# Patient Record
Sex: MALE | Race: BLACK OR AFRICAN AMERICAN | NOT HISPANIC OR LATINO | URBAN - METROPOLITAN AREA
[De-identification: names, ages, dates, MRNs, and addresses within clinical notes are randomized per-mention and may not be internally consistent; named-entity substitution may affect disease eponyms.]

---

## 2023-03-11 PROBLEM — I50.32 CHRONIC HEART FAILURE WITH PRESERVED EJECTION FRACTION (MULTI): Status: ACTIVE | Noted: 2023-03-11

## 2023-03-11 PROBLEM — Z99.2 ESRD ON HEMODIALYSIS (MULTI): Status: ACTIVE | Noted: 2023-03-11

## 2023-03-11 PROBLEM — I25.10 CAD (CORONARY ARTERY DISEASE): Status: ACTIVE | Noted: 2023-03-11

## 2023-03-11 PROBLEM — I10 HTN (HYPERTENSION): Status: ACTIVE | Noted: 2023-03-11

## 2023-03-11 PROBLEM — M10.9 GOUT: Status: ACTIVE | Noted: 2023-03-11

## 2023-03-11 PROBLEM — N18.6 ESRD ON HEMODIALYSIS (MULTI): Status: ACTIVE | Noted: 2023-03-11

## 2023-03-11 PROBLEM — F01.50 VASCULAR DEMENTIA WITHOUT BEHAVIORAL DISTURBANCE (MULTI): Status: ACTIVE | Noted: 2023-03-11

## 2023-03-30 ENCOUNTER — NURSING HOME VISIT (OUTPATIENT)
Dept: POST ACUTE CARE | Facility: EXTERNAL LOCATION | Age: 78
End: 2023-03-30
Payer: OTHER GOVERNMENT

## 2023-03-30 ENCOUNTER — NURSING HOME VISIT (OUTPATIENT)
Dept: POST ACUTE CARE | Facility: EXTERNAL LOCATION | Age: 78
End: 2023-03-30

## 2023-03-30 VITALS
HEART RATE: 75 BPM | SYSTOLIC BLOOD PRESSURE: 157 MMHG | OXYGEN SATURATION: 99 % | DIASTOLIC BLOOD PRESSURE: 80 MMHG | RESPIRATION RATE: 18 BRPM | WEIGHT: 143 LBS | TEMPERATURE: 98 F

## 2023-03-30 VITALS
RESPIRATION RATE: 18 BRPM | HEART RATE: 75 BPM | WEIGHT: 144 LBS | SYSTOLIC BLOOD PRESSURE: 157 MMHG | OXYGEN SATURATION: 99 % | DIASTOLIC BLOOD PRESSURE: 80 MMHG | TEMPERATURE: 98 F

## 2023-03-30 DIAGNOSIS — I10 PRIMARY HYPERTENSION: ICD-10-CM

## 2023-03-30 DIAGNOSIS — F01.50 VASCULAR DEMENTIA WITHOUT BEHAVIORAL DISTURBANCE (MULTI): ICD-10-CM

## 2023-03-30 DIAGNOSIS — Z99.2 ESRD ON HEMODIALYSIS (MULTI): ICD-10-CM

## 2023-03-30 DIAGNOSIS — N18.6 ESRD ON HEMODIALYSIS (MULTI): ICD-10-CM

## 2023-03-30 DIAGNOSIS — K92.2 GASTROINTESTINAL HEMORRHAGE, UNSPECIFIED GASTROINTESTINAL HEMORRHAGE TYPE: ICD-10-CM

## 2023-03-30 DIAGNOSIS — D63.8 ANEMIA IN OTHER CHRONIC DISEASES CLASSIFIED ELSEWHERE: ICD-10-CM

## 2023-03-30 DIAGNOSIS — I82.623 ACUTE DEEP VEIN THROMBOSIS (DVT) OF BOTH UPPER EXTREMITIES, UNSPECIFIED VEIN (MULTI): Primary | ICD-10-CM

## 2023-03-30 DIAGNOSIS — I50.32 CHRONIC HEART FAILURE WITH PRESERVED EJECTION FRACTION (MULTI): ICD-10-CM

## 2023-03-30 PROBLEM — D64.89 OTHER SPECIFIED ANEMIAS: Status: ACTIVE | Noted: 2023-03-30

## 2023-03-30 PROCEDURE — 99310 SBSQ NF CARE HIGH MDM 45: CPT | Performed by: NURSE PRACTITIONER

## 2023-03-30 PROCEDURE — 99305 1ST NF CARE MODERATE MDM 35: CPT | Performed by: INTERNAL MEDICINE

## 2023-03-30 ASSESSMENT — ENCOUNTER SYMPTOMS
FEVER: 0
DIARRHEA: 0
COUGH: 0
NAUSEA: 0
VOMITING: 0
FATIGUE: 0
PALPITATIONS: 0
CONSTIPATION: 0
SHORTNESS OF BREATH: 0
CHILLS: 0
ABDOMINAL PAIN: 0

## 2023-03-30 NOTE — PROGRESS NOTES
Subjective   Patient ID: Raymond Bruton is a 77 y.o. male who is long term resident being seen and evaluated for multiple medical problems.    HPI   Raymond Bruton is a 77 y.o. male returned from hospitalization due to  bilateral upper extremity edema, found to have bilateral DVT's to upper extremities.    HPI   Minimal records available to review.  He staets he was told a clot in each arm.  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  He has a hx of reent gi bleedig, unidentified source of bleeding after sigmoidoscopy and capsule endoscopy.  He is a Hinduism and refuses blood transfusions.  He was evaluated by hematology and started on low dose eliquis, initial plan is to continue for 6 weeks, he is to follow up with hematology in 5 weeks.  He states he is feeling well, had a bowel movement earlier today.    Per notes he was hypertensive during hospital stay and coreg dose was increased.    Bowel products also increased in an attempt to decrease any straining with bowel movements.   Per GI notes possible source of recent gi bleed was hemorrhoid vs diverticular vs anastamosis site.    Labs will be monitored through dialysis center    As above the patient presents having had bilateral arm DVTs diagnosed.  He is cautiously being anticoagulated with Eliquis for 6 weeks.  This of course will have to be reconsidered if he does manifest any recurrent GI bleeding.  He is resting comfortably in his room and has no complaints of pain or shortness of breath me at this time.  He continues with regular treatments at the dialysis center who monitor his lab work.  The patient is a Baptism apparently and is reluctant to receive blood transfusion in the event of significant bleeding.        No labs available for review     MEDS:  Amlodipine  Apixaban (new)  Ascorbic acid  Coreg (dose increased)  Vitamin D  B12  Senna plus (dose increased)  Doxercalciferol through  dialysis center  Hydralaizine  Lisinopril  Renal MVI  Pantoprazole  Miralax  Sevelamer  Ferrous sulfate  Folic acid  MVI  Review of Systems   Constitutional:  Negative for chills, diaphoresis and fever.   Respiratory:  Negative for cough and shortness of breath.    Cardiovascular:  Negative for chest pain and leg swelling.   Gastrointestinal:  Negative for constipation, diarrhea, nausea and vomiting.   Musculoskeletal:  Negative for joint swelling and myalgias.       Objective   /80   Pulse 75   Temp 36.7 °C (98 °F)   Resp 18   Wt 65.3 kg (144 lb)   SpO2 99%     Physical Exam  Vitals reviewed.   Constitutional:       General: He is not in acute distress.     Appearance: He is not ill-appearing.   Cardiovascular:      Rate and Rhythm: Normal rate and regular rhythm.      Pulses: Normal pulses.      Heart sounds:      No gallop.   Pulmonary:      Breath sounds: Normal breath sounds. No wheezing, rhonchi or rales.   Abdominal:      General: Abdomen is flat. Bowel sounds are normal.      Palpations: Abdomen is soft.      Tenderness: There is no guarding or rebound.   Musculoskeletal:      Right lower leg: No edema.      Left lower leg: No edema.      Comments: The patient has an indwelling hemodialysis catheter in the right upper chest.         Assessment/Plan   Problem List Items Addressed This Visit          Circulatory    Chronic heart failure with preserved ejection fraction (CMS/HCC)    HTN (hypertension)    Acute deep vein thrombosis (DVT) of both upper extremities (CMS/HCC) - Primary       Digestive    GI bleed       Genitourinary    ESRD on hemodialysis (CMS/HCC)       Hematologic    Other specified anemias        Goals    None     A.  We will continue with restorative and supportive care as the patient tolerates    B.  The patient left and ongoing follow-up with hemodialysis for renal replacement therapy for his end-stage renal disease    C.  As mentioned above the patient is cautiously  anticoagulated with Eliquis renal dose and this will be continued for 6 weeks as requested and recommended.    D.  Should the patient have significant GI bleeding then anticoagulation will have to be discontinued.    8.  This patient's prognosis is guarded.

## 2023-03-30 NOTE — PROGRESS NOTES
Subjective   Raymond Bruton is a 77 y.o. male returned from hospitalization due to  bilateral upper extremity edema, found to have bilateral DVT's to upper extremities.    [facility] in [place]   Minimal records available to review.  He staets he was told a clot in each arm.  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  He has a hx of reent gi bleedig, unidentified source of bleeding after sigmoidoscopy and capsule endoscopy.  He is a Catholic and refuses blood transfusions.  He was evaluated by hematology and started on low dose eliquis, initial plan is to continue for 6 weeks, he is to follow up with hematology in 5 weeks.  He states he is feeling well, had a bowel movement earlier today.    Per notes he was hypertensive during hospital stay and coreg dose was increased.    Bowel products also increased in an attempt to decrease any straining with bowel movements.   Per GI notes possible source of recent gi bleed was hemorrhoid vs diverticular vs anastamosis site.    Labs will be monitored through dialysis center       No labs available for review    MEDS:  Amlodipine  Apixaban (new)  Ascorbic acid  Coreg (dose increased)  Vitamin D  B12  Senna plus (dose increased)  Doxercalciferol through dialysis center  Hydralaizine  Lisinopril  Renal MVI  Pantoprazole  Miralax  Sevelamer  Ferrous sulfate  Folic acid  MVI      Review of Systems   Constitutional:  Negative for chills, fatigue and fever.   Respiratory:  Negative for cough and shortness of breath.    Cardiovascular:  Negative for chest pain and palpitations.   Gastrointestinal:  Negative for abdominal pain, constipation, diarrhea, nausea and vomiting.       Objective   /80   Pulse 75   Temp 36.7 °C (98 °F)   Resp 18   Wt 64.9 kg (143 lb)   SpO2 99%     Physical Exam  Constitutional:       General: He is not in acute distress.  HENT:      Head: Normocephalic and atraumatic.      Mouth/Throat:      Comments: Poor  dentition  Eyes:      Conjunctiva/sclera: Conjunctivae normal.   Cardiovascular:      Rate and Rhythm: Normal rate and regular rhythm.   Pulmonary:      Effort: Pulmonary effort is normal. No respiratory distress.      Breath sounds: Normal breath sounds.      Comments: Slightly diminished  Abdominal:      General: Bowel sounds are normal. There is no distension.      Palpations: Abdomen is soft.      Tenderness: There is no abdominal tenderness.   Musculoskeletal:         General: Normal range of motion.      Right lower leg: No edema.      Left lower leg: No edema.      Comments: Mild edema noted to RUE   Skin:     General: Skin is warm and dry.      Comments: Right upper chest wall hemodialysis catheters intact    Neurological:      General: No focal deficit present.      Mental Status: He is alert.      Comments: Slightly poor historian   Psychiatric:         Mood and Affect: Mood normal.         Behavior: Behavior normal.         Assessment/Plan   Problem List Items Addressed This Visit          Nervous    Vascular dementia without behavioral disturbance (CMS/HCC)     Oriented times 2,  he was deemed unable to make medical or financial decisions.  Daughter Aimee is POA.              Circulatory    Chronic heart failure with preserved ejection fraction (CMS/HCC)     Fluid status monitored by dialysis center.   No symptoms of central fluid volume overload.             HTN (hypertension)     He was hypertensive in hospital, coreg increased to daily.  Continue to monitor and adjust meds based on clinical course.           Acute deep vein thrombosis (DVT) of both upper extremities (CMS/HCC) - Primary     He was evaluated by hematology.  He has hx of recent GI bleed but also with dvt's he was started on low dose eliquis  Plan at this time is for elqiuis for 6 weeks, he is to follow up with hematology at 5 weeks for further risk vs benefit of continued anticoagulation.  He is a Orthodox and refuses blood  transfusions.     His bowel products were increased to avoid straining while bowel movement.  He was instructed to not flush any bowel movement that appears to contain any sx of blood so that nurse can evaluate.              Digestive    GI bleed     Staff to monitor and notify for any recurrence of symptoms.              Genitourinary    ESRD on hemodialysis (CMS/[facility])     Follow up with dialysis center.    Labs to be monitored through dialysis center        labs/meds/orders reviewed  staff to monitor and notify for any changes.  Staff to closely monitor for any sx of bleeding  Follow up with dialysis center, labs to be monitored through dialysis center.   Follow up with hematology

## 2023-03-30 NOTE — ASSESSMENT & PLAN NOTE
He was hypertensive in hospital, coreg increased to daily.  Continue to monitor and adjust meds based on clinical course.

## 2023-03-30 NOTE — LETTER
Patient: Raymond Bruton  : 1945    Encounter Date: 2023    Subjective  Raymond Bruton is a 77 y.o. male returned from hospitalization due to  bilateral upper extremity edema, found to have bilateral DVT's to upper extremities.    [facility]   Minimal records available to review.  He staets he was told a clot in each arm.  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  He has a hx of reent gi bleedig, unidentified source of bleeding after sigmoidoscopy and capsule endoscopy.  He is a Anglican and refuses blood transfusions.  He was evaluated by hematology and started on low dose eliquis, initial plan is to continue for 6 weeks, he is to follow up with hematology in 5 weeks.  He states he is feeling well, had a bowel movement earlier today.    Per notes he was hypertensive during hospital stay and coreg dose was increased.    Bowel products also increased in an attempt to decrease any straining with bowel movements.   Per GI notes possible source of recent gi bleed was hemorrhoid vs diverticular vs anastamosis site.    Labs will be monitored through dialysis center       No labs available for review    MEDS:  Amlodipine  Apixaban (new)  Ascorbic acid  Coreg (dose increased)  Vitamin D  B12  Senna plus (dose increased)  Doxercalciferol through dialysis center  Hydralaizine  Lisinopril  Renal MVI  Pantoprazole  Miralax  Sevelamer  Ferrous sulfate  Folic acid  MVI      Review of Systems   Constitutional:  Negative for chills, fatigue and fever.   Respiratory:  Negative for cough and shortness of breath.    Cardiovascular:  Negative for chest pain and palpitations.   Gastrointestinal:  Negative for abdominal pain, constipation, diarrhea, nausea and vomiting.       Objective  /80   Pulse 75   Temp 36.7 °C (98 °F)   Resp 18   Wt 64.9 kg (143 lb)   SpO2 99%     Physical Exam  Constitutional:       General: He is not in acute distress.  HENT:       Head: Normocephalic and atraumatic.      Mouth/Throat:      Comments: Poor dentition  Eyes:      Conjunctiva/sclera: Conjunctivae normal.   Cardiovascular:      Rate and Rhythm: Normal rate and regular rhythm.   Pulmonary:      Effort: Pulmonary effort is normal. No respiratory distress.      Breath sounds: Normal breath sounds.      Comments: Slightly diminished  Abdominal:      General: Bowel sounds are normal. There is no distension.      Palpations: Abdomen is soft.      Tenderness: There is no abdominal tenderness.   Musculoskeletal:         General: Normal range of motion.      Right lower leg: No edema.      Left lower leg: No edema.      Comments: Mild edema noted to RUE   Skin:     General: Skin is warm and dry.      Comments: Right upper chest wall hemodialysis catheters intact    Neurological:      General: No focal deficit present.      Mental Status: He is alert.      Comments: Slightly poor historian   Psychiatric:         Mood and Affect: Mood normal.         Behavior: Behavior normal.         Assessment/Plan  Problem List Items Addressed This Visit          Nervous    Vascular dementia without behavioral disturbance (CMS/HCC)     Oriented times 2,  he was deemed unable to make medical or financial decisions.  Daughter Aimee is POA.              Circulatory    Chronic heart failure with preserved ejection fraction (CMS/HCC)     Fluid status monitored by dialysis center.   No symptoms of central fluid volume overload.             HTN (hypertension)     He was hypertensive in hospital, coreg increased to daily.  Continue to monitor and adjust meds based on clinical course.           Acute deep vein thrombosis (DVT) of both upper extremities (CMS/HCC) - Primary     He was evaluated by hematology.  He has hx of recent GI bleed but also with dvt's he was started on low dose eliquis  Plan at this time is for elqiuis for 6 weeks, he is to follow up with hematology at 5 weeks for further risk vs benefit of  continued anticoagulation.  He is a Jainism and refuses blood transfusions.     His bowel products were increased to avoid straining while bowel movement.  He was instructed to not flush any bowel movement that appears to contain any sx of blood so that nurse can evaluate.              Digestive    GI bleed     Staff to monitor and notify for any recurrence of symptoms.              Genitourinary    ESRD on hemodialysis (CMS/[facility])     Follow up with dialysis center.    Labs to be monitored through dialysis center        labs/meds/orders reviewed  staff to monitor and notify for any changes.  Staff to closely monitor for any sx of bleeding  Follow up with dialysis center, labs to be monitored through dialysis center.   Follow up with hematology       Electronically Signed By: KLAUDIA Rocha   3/30/23  5:11 PM

## 2023-03-30 NOTE — LETTER
Patient: Raymond Bruton  : 1945    Encounter Date: 2023    Subjective  Patient ID: Raymond Bruton is a 77 y.o. male who is long term resident being seen and evaluated for multiple medical problems.    HPI   Raymond Bruton is a 77 y.o. male returned from hospitalization due to  bilateral upper extremity edema, found to have bilateral DVT's to upper extremities.    [facility]   Minimal records available to review.  He staets he was told a clot in each arm.  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  He has a hx of reent gi bleedig, unidentified source of bleeding after sigmoidoscopy and capsule endoscopy.  He is a Religious and refuses blood transfusions.  He was evaluated by hematology and started on low dose eliquis, initial plan is to continue for 6 weeks, he is to follow up with hematology in 5 weeks.  He states he is feeling well, had a bowel movement earlier today.    Per notes he was hypertensive during hospital stay and coreg dose was increased.    Bowel products also increased in an attempt to decrease any straining with bowel movements.   Per GI notes possible source of recent gi bleed was hemorrhoid vs diverticular vs anastamosis site.    Labs will be monitored through dialysis center    As above the patient presents having had bilateral arm DVTs diagnosed.  He is cautiously being anticoagulated with Eliquis for 6 weeks.  This of course will have to be reconsidered if he does manifest any recurrent GI bleeding.  He is resting comfortably in his room and has no complaints of pain or shortness of breath me at this time.  He continues with regular treatments at the dialysis center who monitor his lab work.  The patient is a Holiness apparently and is reluctant to receive blood transfusion in the event of significant bleeding.        No labs available for review     MEDS:  Amlodipine  Apixaban (new)  Ascorbic acid  Coreg (dose  increased)  Vitamin D  B12  Senna plus (dose increased)  Doxercalciferol through dialysis center  Hydralaizine  Lisinopril  Renal MVI  Pantoprazole  Miralax  Sevelamer  Ferrous sulfate  Folic acid  MVI  Review of Systems   Constitutional:  Negative for chills, diaphoresis and fever.   Respiratory:  Negative for cough and shortness of breath.    Cardiovascular:  Negative for chest pain and leg swelling.   Gastrointestinal:  Negative for constipation, diarrhea, nausea and vomiting.   Musculoskeletal:  Negative for joint swelling and myalgias.       Objective  /80   Pulse 75   Temp 36.7 °C (98 °F)   Resp 18   Wt 65.3 kg (144 lb)   SpO2 99%     Physical Exam  Vitals reviewed.   Constitutional:       General: He is not in acute distress.     Appearance: He is not ill-appearing.   Cardiovascular:      Rate and Rhythm: Normal rate and regular rhythm.      Pulses: Normal pulses.      Heart sounds:      No gallop.   Pulmonary:      Breath sounds: Normal breath sounds. No wheezing, rhonchi or rales.   Abdominal:      General: Abdomen is flat. Bowel sounds are normal.      Palpations: Abdomen is soft.      Tenderness: There is no guarding or rebound.   Musculoskeletal:      Right lower leg: No edema.      Left lower leg: No edema.      Comments: The patient has an indwelling hemodialysis catheter in the right upper chest.         Assessment/Plan  Problem List Items Addressed This Visit          Circulatory    Chronic heart failure with preserved ejection fraction (CMS/HCC)    HTN (hypertension)    Acute deep vein thrombosis (DVT) of both upper extremities (CMS/HCC) - Primary       Digestive    GI bleed       Genitourinary    ESRD on hemodialysis (CMS/HCC)       Hematologic    Other specified anemias        Goals    None     A.  We will continue with restorative and supportive care as the patient tolerates    B.  The patient left and ongoing follow-up with hemodialysis for renal replacement therapy for his end-stage  renal disease    C.  As mentioned above the patient is cautiously anticoagulated with Eliquis renal dose and this will be continued for 6 weeks as requested and recommended.    D.  Should the patient have significant GI bleeding then anticoagulation will have to be discontinued.    8.  This patient's prognosis is guarded.      Electronically Signed By: Jacob Posada MD   4/4/23  6:34 PM

## 2023-03-30 NOTE — ASSESSMENT & PLAN NOTE
He was evaluated by hematology.  He has hx of recent GI bleed but also with dvt's he was started on low dose eliquis  Plan at this time is for elqiuis for 6 weeks, he is to follow up with hematology at 5 weeks for further risk vs benefit of continued anticoagulation.  He is a Christian and refuses blood transfusions.     His bowel products were increased to avoid straining while bowel movement.  He was instructed to not flush any bowel movement that appears to contain any sx of blood so that nurse can evaluate.

## 2023-03-30 NOTE — ASSESSMENT & PLAN NOTE
Oriented times 2,  he was deemed unable to make medical or financial decisions.  Daughter Aimee is POA.

## 2023-04-04 ASSESSMENT — ENCOUNTER SYMPTOMS
NAUSEA: 0
CONSTIPATION: 0
VOMITING: 0
SHORTNESS OF BREATH: 0
CHILLS: 0
JOINT SWELLING: 0
MYALGIAS: 0
FEVER: 0
COUGH: 0
DIAPHORESIS: 0
DIARRHEA: 0

## 2023-04-21 ENCOUNTER — NURSING HOME VISIT (OUTPATIENT)
Dept: POST ACUTE CARE | Facility: EXTERNAL LOCATION | Age: 78
End: 2023-04-21
Payer: OTHER GOVERNMENT

## 2023-04-21 VITALS
RESPIRATION RATE: 18 BRPM | OXYGEN SATURATION: 98 % | SYSTOLIC BLOOD PRESSURE: 144 MMHG | DIASTOLIC BLOOD PRESSURE: 76 MMHG | WEIGHT: 145 LBS | HEART RATE: 72 BPM | TEMPERATURE: 97 F

## 2023-04-21 DIAGNOSIS — Z99.2 ESRD ON HEMODIALYSIS (MULTI): Primary | ICD-10-CM

## 2023-04-21 DIAGNOSIS — I82.623 ACUTE DEEP VEIN THROMBOSIS (DVT) OF BOTH UPPER EXTREMITIES, UNSPECIFIED VEIN (MULTI): ICD-10-CM

## 2023-04-21 DIAGNOSIS — F01.50 VASCULAR DEMENTIA WITHOUT BEHAVIORAL DISTURBANCE (MULTI): ICD-10-CM

## 2023-04-21 DIAGNOSIS — I10 PRIMARY HYPERTENSION: ICD-10-CM

## 2023-04-21 DIAGNOSIS — N18.6 ESRD ON HEMODIALYSIS (MULTI): Primary | ICD-10-CM

## 2023-04-21 DIAGNOSIS — K92.2 GASTROINTESTINAL HEMORRHAGE, UNSPECIFIED GASTROINTESTINAL HEMORRHAGE TYPE: ICD-10-CM

## 2023-04-21 DIAGNOSIS — I50.32 CHRONIC HEART FAILURE WITH PRESERVED EJECTION FRACTION (MULTI): ICD-10-CM

## 2023-04-21 PROCEDURE — 99309 SBSQ NF CARE MODERATE MDM 30: CPT | Performed by: NURSE PRACTITIONER

## 2023-04-21 ASSESSMENT — ENCOUNTER SYMPTOMS
PALPITATIONS: 0
FATIGUE: 0
SHORTNESS OF BREATH: 0
NAUSEA: 0
CONSTIPATION: 0
DIARRHEA: 0
FEVER: 0
ABDOMINAL PAIN: 0
COUGH: 0
VOMITING: 0
CHILLS: 0

## 2023-04-21 NOTE — ASSESSMENT & PLAN NOTE
A few weeks ago he had a little blood with bowel movement but this resolved with no recurrence.  Staff to monitor and notify for any recurrence of symptoms.

## 2023-04-21 NOTE — PROGRESS NOTES
Subjective   Raymond Bruton is a 77 y.o. male Here for routine monthly visit.    HPI There are no new problems and multiple health problems have been reviewed.  The course has been unchanged.  The patient  is oriented but forgetful at times..   There are no family members present during time of visit.    he  denies any complaints when asked.  Eating and drinking well.   No concerns per staff.   No recent known recurrence of gi bleeding.       Review of Systems   Constitutional:  Negative for chills, fatigue and fever.   Respiratory:  Negative for cough and shortness of breath.    Cardiovascular:  Negative for chest pain and palpitations.   Gastrointestinal:  Negative for abdominal pain, constipation, diarrhea, nausea and vomiting.       Objective   /76   Pulse 72   Temp 36.1 °C (97 °F)   Resp 18   Wt 65.8 kg (145 lb)   SpO2 98%     Physical Exam  Constitutional:       General: He is not in acute distress.  HENT:      Head: Normocephalic and atraumatic.      Mouth/Throat:      Comments: Poor dentition  Eyes:      Conjunctiva/sclera: Conjunctivae normal.   Cardiovascular:      Rate and Rhythm: Normal rate and regular rhythm.   Pulmonary:      Effort: Pulmonary effort is normal. No respiratory distress.      Breath sounds: Normal breath sounds.      Comments: Slightly diminished  Abdominal:      General: Bowel sounds are normal. There is no distension.      Palpations: Abdomen is soft.      Tenderness: There is no abdominal tenderness.   Musculoskeletal:         General: Normal range of motion.      Right lower leg: No edema.      Left lower leg: No edema.      Comments: Mild edema noted to RUE   Skin:     General: Skin is warm and dry.      Comments: Right upper chest wall hemodialysis catheters intact    Neurological:      General: No focal deficit present.      Mental Status: He is alert.      Comments: Slightly poor historian   Psychiatric:         Mood and Affect: Mood normal.         Behavior: Behavior  normal.         Assessment/Plan   Problem List Items Addressed This Visit          Nervous    Vascular dementia without behavioral disturbance (CMS/[facility])     Oriented times 2,  he was deemed unable to make medical or financial decisions.  Daughter Aimee is POA.               Circulatory    Chronic heart failure with preserved ejection fraction (CMS/[facility])     Fluid status monitored by dialysis center.   No symptoms of central fluid volume overload.             HTN (hypertension)     BP's have been intermittently elevated and BP recordings have been sent to nephrologist at dialysis center.  Continue to monitor and adjust meds based on clinical course.             Acute deep vein thrombosis (DVT) of both upper extremities (CMS/[facility])     He was evaluated by hematology in hosp\[facility]\""..  He has hx of recent GI bleed but also with dvt's he was started on low dose eliquis  Plan at this time is for elqiuis for 6 weeks, he is to follow up with hematology at 5 weeks for further risk vs benefit of continued anticoagulation.  He is a Holiness and refuses blood transfusions.     His bowel products were increased to avoid straining while bowel movement.  He was instructed to not flush any bowel movement that appears to contain any sx of blood so that nurse can evaluate.   No recent bleeding with bowel movement noted.              Digestive    GI bleed     A few weeks ago he had a little blood with bowel movement but this resolved with no recurrence.  Staff to monitor and notify for any recurrence of symptoms.              Genitourinary    ESRD on hemodialysis (CMS/[facility]) - Primary     Follow up with dialysis center.    Labs to be monitored through dialysis center        labs/meds/orders reviewed  staff to monitor and notify for any changes.  Follow up with hematology  Follow up with dialysis center.   Labs through dialysis center  Ss for discharge planning, likely would be acceptable at [facility]

## 2023-04-21 NOTE — LETTER
Patient: Raymond Bruton  : 1945    Encounter Date: 2023    Subjective  Raymond Bruton is a 77 y.o. male Here for routine monthly visit.    HPI There are no new problems and multiple health problems have been reviewed.  The course has been unchanged.  The patient  is oriented but forgetful at times..   There are no family members present during time of visit.    he  denies any complaints when asked.  Eating and drinking well.   No concerns per staff.   No recent known recurrence of gi bleeding.       Review of Systems   Constitutional:  Negative for chills, fatigue and fever.   Respiratory:  Negative for cough and shortness of breath.    Cardiovascular:  Negative for chest pain and palpitations.   Gastrointestinal:  Negative for abdominal pain, constipation, diarrhea, nausea and vomiting.       Objective  /76   Pulse 72   Temp 36.1 °C (97 °F)   Resp 18   Wt 65.8 kg (145 lb)   SpO2 98%     Physical Exam  Constitutional:       General: He is not in acute distress.  HENT:      Head: Normocephalic and atraumatic.      Mouth/Throat:      Comments: Poor dentition  Eyes:      Conjunctiva/sclera: Conjunctivae normal.   Cardiovascular:      Rate and Rhythm: Normal rate and regular rhythm.   Pulmonary:      Effort: Pulmonary effort is normal. No respiratory distress.      Breath sounds: Normal breath sounds.      Comments: Slightly diminished  Abdominal:      General: Bowel sounds are normal. There is no distension.      Palpations: Abdomen is soft.      Tenderness: There is no abdominal tenderness.   Musculoskeletal:         General: Normal range of motion.      Right lower leg: No edema.      Left lower leg: No edema.      Comments: Mild edema noted to RUE   Skin:     General: Skin is warm and dry.      Comments: Right upper chest wall hemodialysis catheters intact    Neurological:      General: No focal deficit present.      Mental Status: He is alert.      Comments: Slightly poor historian    Psychiatric:         Mood and Affect: Mood normal.         Behavior: Behavior normal.         Assessment/Plan  Problem List Items Addressed This Visit          Nervous    Vascular dementia without behavioral disturbance (CMS/HCC)     Oriented times 2,  he was deemed unable to make medical or financial decisions.  Daughter Aimee is POA.               Circulatory    Chronic heart failure with preserved ejection fraction (CMS/HCC)     Fluid status monitored by dialysis center.   No symptoms of central fluid volume overload.             HTN (hypertension)     BP's have been intermittently elevated and BP recordings have been sent to nephrologist at dialysis center.  Continue to monitor and adjust meds based on clinical course.             Acute deep vein thrombosis (DVT) of both upper extremities (CMS/HCC)     He was evaluated by hematology in [facility]..  He has hx of recent GI bleed but also with dvt's he was started on low dose eliquis  Plan at this time is for elqiuis for 6 weeks, he is to follow up with hematology at 5 weeks for further risk vs benefit of continued anticoagulation.  He is a [employer] and refuses blood transfusions.     His bowel products were increased to avoid straining while bowel movement.  He was instructed to not flush any bowel movement that appears to contain any sx of blood so that nurse can evaluate.   No recent bleeding with bowel movement noted.              Digestive    GI bleed     A few weeks ago he had a little blood with bowel movement but this resolved with no recurrence.  Staff to monitor and notify for any recurrence of symptoms.              Genitourinary    ESRD on hemodialysis (CMS/HCC) - Primary     Follow up with dialysis center.    Labs to be monitored through dialysis center        labs/meds/orders reviewed  staff to monitor and notify for any changes.  Follow up with hematology  Follow up with dialysis center.   Labs through dialysis center  Ss for discharge  planning, likely would be acceptable at [facility]      Electronically Signed By: HEATH Rocha-CNP   4/21/23  6:39 PM

## 2023-04-21 NOTE — ASSESSMENT & PLAN NOTE
BP's have been intermittently elevated and BP recordings have been sent to nephrologist at dialysis center.  Continue to monitor and adjust meds based on clinical course.

## 2023-04-21 NOTE — ASSESSMENT & PLAN NOTE
He was evaluated by hematology in hosp\[facility]""..  He has hx of recent GI bleed but also with dvt's he was started on low dose eliquis  Plan at this time is for elqiuis for 6 weeks, he is to follow up with hematology at 5 weeks for further risk vs benefit of continued anticoagulation.  He is a [employer]oration[employer] and refuses blood transfusions.     His bowel products were increased to avoid straining while bowel movement.  He was instructed to not flush any bowel movement that appears to contain any sx of blood so that nurse can evaluate.   No recent bleeding with bowel movement noted.

## 2023-05-22 ENCOUNTER — NURSING HOME VISIT (OUTPATIENT)
Dept: POST ACUTE CARE | Facility: EXTERNAL LOCATION | Age: 78
End: 2023-05-22
Payer: OTHER GOVERNMENT

## 2023-05-22 DIAGNOSIS — I10 PRIMARY HYPERTENSION: ICD-10-CM

## 2023-05-22 DIAGNOSIS — B37.81 ESOPHAGEAL CANDIDIASIS (MULTI): Primary | ICD-10-CM

## 2023-05-22 DIAGNOSIS — Z99.2 ESRD ON HEMODIALYSIS (MULTI): ICD-10-CM

## 2023-05-22 DIAGNOSIS — N18.6 ESRD ON HEMODIALYSIS (MULTI): ICD-10-CM

## 2023-05-22 DIAGNOSIS — R16.1 SPLENIC MASS: ICD-10-CM

## 2023-05-22 PROCEDURE — 99310 SBSQ NF CARE HIGH MDM 45: CPT | Performed by: NURSE PRACTITIONER

## 2023-05-22 NOTE — PROGRESS NOTES
Subjective Raymond Bruton is a 77 y.o. male returns from hospitalization due to difficulty swallowing.    HPI  He was found to have esophageal candidiasis per egd, , started on fluconazole and states his swallowing has improved.    It is recommended he have a repeat EGD in 6-8 weeks and [facility].    While in hospital also was found to have persistent DVT in bilateral upper extremities and apixaban that was preivously discontinued has been resumed.  CT abdomen and pelvis showed bladder wall thickenng and irregularity to bladder wall and a 5cm splenic mass, recommending outpatient MRI.  PET scan was completed showing moderately active area between stomach and liver which appears to correlate to area biopsied on EGD.  BP has been labile and meds were adjusted by nephrology.    He has been tolerating dialysis.     Labs: 5/15   WBC:  10.1  Hgb:  10.2  Hct: 32.6  Platelet: 207    5./17  Na: 147  K: 4.5  Cl: 109  Co2: 23  BUN: 40  Creatinine: 8.6    MEDS:  Fluconaole through 6/1  Phenol spray prn  Amlodipine  Apixaban (resumed)  Coreg  Vitamin D  Folic acid  Lisinopril (dose increased)  MVI   Pantoprazole    MEDS THAT WERE Dc'd: ascorbic acid, ferrous sulfate, sevelamer, hydralazine        Review of Systems   Constitutional:  Negative for chills, fatigue and fever.   Respiratory:  Negative for cough and shortness of breath.    Cardiovascular:  Negative for chest pain and palpitations.   Gastrointestinal:  Negative for abdominal pain, constipation, diarrhea, nausea and vomiting.       Objective   /82   Pulse 72   Temp 36.4 °C (97.6 °F)   Resp 18   Wt 63 kg (139 lb)   SpO2 97%     Physical Exam  Constitutional:       General: He is not in acute distress.  HENT:      Head: Normocephalic and atraumatic.      Mouth/Throat:      Comments: Poor dentition  Eyes:      Conjunctiva/sclera: Conjunctivae normal.   Cardiovascular:      Rate and Rhythm: Normal rate and regular rhythm.   Pulmonary:       Effort: Pulmonary effort is normal. No respiratory distress.      Breath sounds: Normal breath sounds.      Comments: Slightly diminished  Abdominal:      General: Bowel sounds are normal. There is no distension.      Palpations: Abdomen is soft.      Tenderness: There is no abdominal tenderness.   Musculoskeletal:         General: Normal range of motion.      Right lower leg: No edema.      Left lower leg: No edema.      Comments: Mild edema noted to RUE   Skin:     General: Skin is warm and dry.      Comments: Right upper chest wall hemodialysis catheters intact    Neurological:      General: No focal deficit present.      Mental Status: He is alert.      Comments: Slightly poor historian   Psychiatric:         Mood and Affect: Mood normal.         Behavior: Behavior normal.         Assessment/Plan   Problem List Items Addressed This Visit          Circulatory    HTN (hypertension)     Lisinopril was increased, hydralazine was discontinued.   Continue to monitor and adjust meds based on clinical course.                    Digestive    Esophageal candidiasis (CMS/HCC) - Primary     Per egd, started on fluconazole and stop date 6/1, states he is swallowing better.             Genitourinary    ESRD on hemodialysis (CMS/HCC)     Follow up with dialysis center.    Labs to be monitored through dialysis center            Other    Splenic mass     5cm mass in spleen, recommended MRI as an outpatient, [facility]        labs/meds/orders reviewed  staff to monitor and notify for any changes.  Hospital records reviewed.  Follow  up with GI for repeat EGD in 6-8 weeks  Follow up [facility] for possibel MRI for splenic mass and abnormal PET scan  Time for coordination of care was greater than 35 minutes with hospital chart review, visit and exam, discussion of treatment plan with patient and also discussion of case with staff.

## 2023-05-22 NOTE — LETTER
Patient: Raymond Bruton  : 1945    Encounter Date: 2023    Subjective  Raymond Bruton is a 77 y.o. male returns from hospitalization due to difficulty swallowing.    HPI  He was found to have esophageal candidiasis per egd, , started on fluconazole and states his swallowing has improved.    It is recommended he have a repeat EGD in 6-8 weeks and will [facility].    While in hospital also was found to have persistent DVT in bilateral upper extremities and apixaban that was preivously discontinued has been resumed.  CT abdomen and pelvis showed bladder wall thickenng and irregularity to bladder wall and a 5cm splenic mass, recommending outpatient MRI.  PET scan was completed showing moderately active area between stomach and liver which appears to correlate to area biopsied on EGD.  BP has been labile and meds were adjusted by nephrology.    He has been tolerating dialysis.     Labs: 5/15   WBC:  10.1  Hgb:  10.2  Hct: 32.6  Platelet: 207    .  Na: 147  K: 4.5  Cl: 109  Co2: 23  BUN: 40  Creatinine: 8.6    MEDS:  Fluconaole through   Phenol spray prn  Amlodipine  Apixaban (resumed)  Coreg  Vitamin D  Folic acid  Lisinopril (dose increased)  MVI   Pantoprazole    MEDS THAT WERE Dc'd: ascorbic acid, ferrous sulfate, sevelamer, hydralazine        Review of Systems   Constitutional:  Negative for chills, fatigue and fever.   Respiratory:  Negative for cough and shortness of breath.    Cardiovascular:  Negative for chest pain and palpitations.   Gastrointestinal:  Negative for abdominal pain, constipation, diarrhea, nausea and vomiting.       Objective  /82   Pulse 72   Temp 36.4 °C (97.6 °F)   Resp 18   Wt 63 kg (139 lb)   SpO2 97%     Physical Exam  Constitutional:       General: He is not in acute distress.  HENT:      Head: Normocephalic and atraumatic.      Mouth/Throat:      Comments: Poor dentition  Eyes:      Conjunctiva/sclera: Conjunctivae normal.   Cardiovascular:       Rate and Rhythm: Normal rate and regular rhythm.   Pulmonary:      Effort: Pulmonary effort is normal. No respiratory distress.      Breath sounds: Normal breath sounds.      Comments: Slightly diminished  Abdominal:      General: Bowel sounds are normal. There is no distension.      Palpations: Abdomen is soft.      Tenderness: There is no abdominal tenderness.   Musculoskeletal:         General: Normal range of motion.      Right lower leg: No edema.      Left lower leg: No edema.      Comments: Mild edema noted to RUE   Skin:     General: Skin is warm and dry.      Comments: Right upper chest wall hemodialysis catheters intact    Neurological:      General: No focal deficit present.      Mental Status: He is alert.      Comments: Slightly poor historian   Psychiatric:         Mood and Affect: Mood normal.         Behavior: Behavior normal.         Assessment/Plan  Problem List Items Addressed This Visit          Circulatory    HTN (hypertension)     Lisinopril was increased, hydralazine was discontinued.   Continue to monitor and adjust meds based on clinical course.                    Digestive    Esophageal candidiasis (CMS/HCC) - Primary     Per egd, started on fluconazole and stop date 6/1, states he is swallowing better.             Genitourinary    ESRD on hemodialysis (CMS/HCC)     Follow up with dialysis center.    Labs to be monitored through dialysis center            Other    Splenic mass     5cm mass in spleen, recommended MRI as an outpatient, [facility]        labs/meds/orders reviewed  staff to monitor and notify for any changes.  Hospital records reviewed.  Follow  up with GI for repeat EGD in 6-8 weeks  Follow up [facility] for possibel MRI for splenic mass and abnormal PET scan  Time for coordination of care was greater than 35 minutes with hospital chart review, visit and exam, discussion of treatment plan with patient and also discussion of case with staff.        Electronically  Signed By: Rebecca Lopez, HEATH-CNP   5/25/23  6:48 PM

## 2023-05-24 ENCOUNTER — NURSING HOME VISIT (OUTPATIENT)
Dept: POST ACUTE CARE | Facility: EXTERNAL LOCATION | Age: 78
End: 2023-05-24
Payer: COMMERCIAL

## 2023-05-24 DIAGNOSIS — I25.10 CORONARY ARTERY DISEASE INVOLVING NATIVE CORONARY ARTERY OF NATIVE HEART, UNSPECIFIED WHETHER ANGINA PRESENT: ICD-10-CM

## 2023-05-24 DIAGNOSIS — Z99.2 ESRD ON HEMODIALYSIS (MULTI): ICD-10-CM

## 2023-05-24 DIAGNOSIS — I50.32 CHRONIC HEART FAILURE WITH PRESERVED EJECTION FRACTION (MULTI): ICD-10-CM

## 2023-05-24 DIAGNOSIS — N18.6 ESRD ON HEMODIALYSIS (MULTI): ICD-10-CM

## 2023-05-24 DIAGNOSIS — R16.1 SPLENIC MASS: ICD-10-CM

## 2023-05-24 DIAGNOSIS — B37.81 ESOPHAGEAL CANDIDIASIS (MULTI): Primary | ICD-10-CM

## 2023-05-24 DIAGNOSIS — I10 PRIMARY HYPERTENSION: ICD-10-CM

## 2023-05-24 DIAGNOSIS — I82.623 ACUTE DEEP VEIN THROMBOSIS (DVT) OF BOTH UPPER EXTREMITIES, UNSPECIFIED VEIN (MULTI): ICD-10-CM

## 2023-05-24 PROCEDURE — 99306 1ST NF CARE HIGH MDM 50: CPT | Performed by: INTERNAL MEDICINE

## 2023-05-24 NOTE — LETTER
Patient: Raymond Bruton  : 1945    Encounter Date: 2023    Subjective  Patient ID: Raymond Bruton is a 77 y.o. male who is long term resident being seen and evaluated for multiple medical problems.    HPI   Raymond Bruton is a 77 y.o. male returns from hospitalization due to difficulty swallowing.    HPI  He was found to have esophageal candidiasis per egd, , started on fluconazole and states his swallowing has improved.    It is recommended he have a repeat EGD in 6-8 weeks and will [facility].    While in hospital also was found to have persistent DVT in bilateral upper extremities and apixaban that was preivously discontinued has been resumed.  CT abdomen and pelvis showed bladder wall thickenng and irregularity to bladder wall and a 5cm splenic mass, recommending outpatient MRI.  PET scan was completed showing moderately active area between stomach and liver which appears to correlate to area biopsied on EGD.  BP has been labile and meds were adjusted by nephrology.    He has been tolerating dialysis.    Patient is sitting up in bed eating lunch and in no distress whatsoever.  He reports his swallowing is back to normal and he feels well.  Per nephrology recommendations for medication changes included increasing lisinopril to 30 mg daily, discontinuation of hydralazine, discontinuation of vitamin C iron and phosphate binder, and continuation of fluconazole and pantoprazole.  [facility]     Labs: 5/15   WBC:  10.1  Hgb:  10.2  Hct: 32.6  Platelet: 207     .  Na: 147  K: 4.5  Cl: 109  Co2: 23  BUN: 40  Creatinine: 8.6     MEDS:  Fluconaole through   Phenol spray prn  Amlodipine  Apixaban (resumed)  Coreg  Vitamin D  Folic acid  Lisinopril (dose increased)  MVI   Pantoprazole     MEDS THAT WERE Dc'd: ascorbic acid, ferrous sulfate, sevelamer, hydralazine     Review of Systems   Constitutional:  Negative for chills,  diaphoresis and fever.   Respiratory:  Negative for cough and shortness of breath.    Cardiovascular:  Negative for chest pain and leg swelling.   Gastrointestinal:  Negative for constipation, diarrhea, nausea and vomiting.   Musculoskeletal:  Negative for joint swelling and myalgias.       Objective  /80   Pulse 80   Temp 36.4 °C (97.6 °F)   Resp 18   Wt 63 kg (139 lb)   SpO2 97%     Physical Exam  Constitutional:       General: He is not in acute distress.  HENT:      Head: Normocephalic and atraumatic.      Mouth/Throat:      Mouth: Mucous membranes are moist.      Comments: Poor dentition  Eyes:      Conjunctiva/sclera: Conjunctivae normal.   Cardiovascular:      Rate and Rhythm: Normal rate and regular rhythm.   Pulmonary:      Effort: Pulmonary effort is normal. No respiratory distress.      Breath sounds: Normal breath sounds.      Comments: Slightly diminished  Abdominal:      General: Bowel sounds are normal. There is no distension.      Palpations: Abdomen is soft.      Tenderness: There is no abdominal tenderness.   Musculoskeletal:         General: Normal range of motion.      Right lower leg: No edema.      Left lower leg: No edema.      Comments: Mild edema noted to RUE   Skin:     General: Skin is warm and dry.      Comments: Right upper chest wall hemodialysis catheters intact    Neurological:      General: No focal deficit present.      Mental Status: He is alert.      Comments: Slightly poor historian   Psychiatric:         Mood and Affect: Mood normal.         Behavior: Behavior normal.         Assessment/Plan  Problem List Items Addressed This Visit          Circulatory    CAD (coronary artery disease)    Chronic heart failure with preserved ejection fraction (CMS/HCC)    HTN (hypertension)    Acute deep vein thrombosis (DVT) of both upper extremities (CMS/[facility])       Digestive    Esophageal candidiasis (CMS/[facility]) - Primary       Genitourinary    ESRD on hemodialysis (CMS/[facility])        Other    Splenic mass       A.  We will continue with restorative and supportive care as the patient tolerates    B.  We will continue with fluconazole and pantoprazole for the time being until follow-up with gastroenterology at which time we should get a discontinuation date for the fluconazole    C.  The patient will presumptively have repeat EGD with gastroenterology and further recommendations regarding the positive PET scan as mentioned above.    D.  The patient will have ongoing hemodialysis through his outpatient hemodialysis unit to also monitor his laboratory examinations    B.  The patient's prognosis is guarded.      Electronically Signed By: Jacob Posada MD   5/31/23  4:51 PM

## 2023-05-25 VITALS
RESPIRATION RATE: 18 BRPM | WEIGHT: 139 LBS | SYSTOLIC BLOOD PRESSURE: 141 MMHG | DIASTOLIC BLOOD PRESSURE: 80 MMHG | OXYGEN SATURATION: 97 % | HEART RATE: 80 BPM | TEMPERATURE: 97.6 F

## 2023-05-25 VITALS
HEART RATE: 72 BPM | RESPIRATION RATE: 18 BRPM | OXYGEN SATURATION: 97 % | WEIGHT: 139 LBS | TEMPERATURE: 97.6 F | DIASTOLIC BLOOD PRESSURE: 82 MMHG | SYSTOLIC BLOOD PRESSURE: 148 MMHG

## 2023-05-25 PROBLEM — R16.1 SPLENIC MASS: Status: ACTIVE | Noted: 2023-05-25

## 2023-05-25 PROBLEM — B37.81 ESOPHAGEAL CANDIDIASIS (MULTI): Status: ACTIVE | Noted: 2023-05-25

## 2023-05-25 ASSESSMENT — ENCOUNTER SYMPTOMS
CHILLS: 0
VOMITING: 0
ABDOMINAL PAIN: 0
DIARRHEA: 0
NAUSEA: 0
CONSTIPATION: 0
FATIGUE: 0
COUGH: 0
SHORTNESS OF BREATH: 0
FEVER: 0
PALPITATIONS: 0

## 2023-05-25 NOTE — ASSESSMENT & PLAN NOTE
5cm mass in spleen, recommended MRI as an outpatient, [facility]   I personally performed the service described in the documentation recorded by the scribe in my presence, and it accurately and completely records my words and actions.

## 2023-05-25 NOTE — ASSESSMENT & PLAN NOTE
Lisinopril was increased, hydralazine was discontinued.   Continue to monitor and adjust meds based on clinical course.

## 2023-05-25 NOTE — PROGRESS NOTES
Subjective   Patient ID: Raymond Bruton is a 77 y.o. male who is long term resident being seen and evaluated for multiple medical problems.    HPI   Raymond Bruton is a 77 y.o. male returns from hospitalization due to difficulty swallowing.    HPI  He was found to have esophageal candidiasis per egd, , started on fluconazole and states his swallowing has improved.    It is recommended he have a repeat EGD in 6-8 weeks and will [facility].    While in hospital also was found to have persistent DVT in bilateral upper extremities and apixaban that was preivously discontinued has been resumed.  CT abdomen and pelvis showed bladder wall thickenng and irregularity to bladder wall and a 5cm splenic mass, recommending outpatient MRI.  PET scan was completed showing moderately active area between stomach and liver which appears to correlate to area biopsied on EGD.  BP has been labile and meds were adjusted by nephrology.    He has been tolerating dialysis.    Patient is sitting up in bed eating lunch and in no distress whatsoever.  He reports his swallowing is back to normal and he feels well.  Per nephrology recommendations for medication changes included increasing lisinopril to 30 mg daily, discontinuation of hydralazine, discontinuation of vitamin C iron and phosphate binder, and continuation of fluconazole and pantoprazole.  He should have follow[facility]     Labs: 5/15   WBC:  10.1  Hgb:  10.2  Hct: 32.6  Platelet: 207     5./17  Na: 147  K: 4.5  Cl: 109  Co2: 23  BUN: 40  Creatinine: 8.6     MEDS:  Fluconaole through 6/1  Phenol spray prn  Amlodipine  Apixaban (resumed)  Coreg  Vitamin D  Folic acid  Lisinopril (dose increased)  MVI   Pantoprazole     MEDS THAT WERE Dc'd: ascorbic acid, ferrous sulfate, sevelamer, hydralazine     Review of Systems   Constitutional:  Negative for chills, diaphoresis and fever.   Respiratory:  Negative for cough and shortness of breath.     Cardiovascular:  Negative for chest pain and leg swelling.   Gastrointestinal:  Negative for constipation, diarrhea, nausea and vomiting.   Musculoskeletal:  Negative for joint swelling and myalgias.       Objective   /80   Pulse 80   Temp 36.4 °C (97.6 °F)   Resp 18   Wt 63 kg (139 lb)   SpO2 97%     Physical Exam  Constitutional:       General: He is not in acute distress.  HENT:      Head: Normocephalic and atraumatic.      Mouth/Throat:      Mouth: Mucous membranes are moist.      Comments: Poor dentition  Eyes:      Conjunctiva/sclera: Conjunctivae normal.   Cardiovascular:      Rate and Rhythm: Normal rate and regular rhythm.   Pulmonary:      Effort: Pulmonary effort is normal. No respiratory distress.      Breath sounds: Normal breath sounds.      Comments: Slightly diminished  Abdominal:      General: Bowel sounds are normal. There is no distension.      Palpations: Abdomen is soft.      Tenderness: There is no abdominal tenderness.   Musculoskeletal:         General: Normal range of motion.      Right lower leg: No edema.      Left lower leg: No edema.      Comments: Mild edema noted to RUE   Skin:     General: Skin is warm and dry.      Comments: Right upper chest wall hemodialysis catheters intact    Neurological:      General: No focal deficit present.      Mental Status: He is alert.      Comments: Slightly poor historian   Psychiatric:         Mood and Affect: Mood normal.         Behavior: Behavior normal.         Assessment/Plan   Problem List Items Addressed This Visit          Circulatory    CAD (coronary artery disease)    Chronic heart failure with preserved ejection fraction (CMS/HCC)    HTN (hypertension)    Acute deep vein thrombosis (DVT) of both upper extremities (CMS/HCC)       Digestive    Esophageal candidiasis (CMS/HCC) - Primary       Genitourinary    ESRD on hemodialysis (CMS/HCC)       Other    Splenic mass       A.  We will continue with restorative and supportive care  as the patient tolerates    B.  We will continue with fluconazole and pantoprazole for the time being until follow-up with gastroenterology at which time we should get a discontinuation date for the fluconazole    C.  The patient will presumptively have repeat EGD with gastroenterology and further recommendations regarding the positive PET scan as mentioned above.    D.  The patient will have ongoing hemodialysis through his outpatient hemodialysis unit to also monitor his laboratory examinations    B.  The patient's prognosis is guarded.

## 2023-05-30 ENCOUNTER — NURSING HOME VISIT (OUTPATIENT)
Dept: POST ACUTE CARE | Facility: EXTERNAL LOCATION | Age: 78
End: 2023-05-30
Payer: OTHER GOVERNMENT

## 2023-05-30 VITALS
DIASTOLIC BLOOD PRESSURE: 76 MMHG | HEART RATE: 76 BPM | RESPIRATION RATE: 18 BRPM | TEMPERATURE: 97.6 F | OXYGEN SATURATION: 98 % | SYSTOLIC BLOOD PRESSURE: 132 MMHG | WEIGHT: 141 LBS

## 2023-05-30 DIAGNOSIS — I50.32 CHRONIC HEART FAILURE WITH PRESERVED EJECTION FRACTION (MULTI): ICD-10-CM

## 2023-05-30 DIAGNOSIS — Z99.2 ESRD ON HEMODIALYSIS (MULTI): ICD-10-CM

## 2023-05-30 DIAGNOSIS — I10 PRIMARY HYPERTENSION: ICD-10-CM

## 2023-05-30 DIAGNOSIS — K92.2 GASTROINTESTINAL HEMORRHAGE, UNSPECIFIED GASTROINTESTINAL HEMORRHAGE TYPE: ICD-10-CM

## 2023-05-30 DIAGNOSIS — B37.81 ESOPHAGEAL CANDIDIASIS (MULTI): Primary | ICD-10-CM

## 2023-05-30 DIAGNOSIS — R16.1 SPLENIC MASS: ICD-10-CM

## 2023-05-30 DIAGNOSIS — N18.6 ESRD ON HEMODIALYSIS (MULTI): ICD-10-CM

## 2023-05-30 PROCEDURE — 99309 SBSQ NF CARE MODERATE MDM 30: CPT | Performed by: NURSE PRACTITIONER

## 2023-05-30 ASSESSMENT — ENCOUNTER SYMPTOMS
FATIGUE: 0
VOMITING: 0
NAUSEA: 0
DIARRHEA: 0
CONSTIPATION: 0
CHILLS: 0
PALPITATIONS: 0
ABDOMINAL PAIN: 0
SHORTNESS OF BREATH: 0
COUGH: 0
FEVER: 0

## 2023-05-30 NOTE — LETTER
Patient: Raymond Bruton  : 1945    Encounter Date: 2023    Subjective  Raymond Bruton is a 77 y.o. male here for weekly skilled visit.   HPI   Here for weekly skilled visit.  Health problems reviewed and no acute concerns.  Participating in therapy and feeling stronger.  Swallowing is improved, he has not started the fluconazole per staff as it has not been delivered by pharmacy.  Denies any complaints of pain.  No concerns per staff.  Following up with dialysis center.             Review of Systems   Constitutional:  Negative for chills, fatigue and fever.   Respiratory:  Negative for cough and shortness of breath.    Cardiovascular:  Negative for chest pain and palpitations.   Gastrointestinal:  Negative for abdominal pain, constipation, diarrhea, nausea and vomiting.       Objective  /76   Pulse 76   Temp 36.4 °C (97.6 °F)   Resp 18   Wt 64 kg (141 lb)   SpO2 98%     Physical Exam  Constitutional:       General: He is not in acute distress.  HENT:      Head: Normocephalic and atraumatic.      Mouth/Throat:      Comments: Poor dentition  Eyes:      Conjunctiva/sclera: Conjunctivae normal.   Cardiovascular:      Rate and Rhythm: Normal rate and regular rhythm.   Pulmonary:      Effort: Pulmonary effort is normal. No respiratory distress.      Breath sounds: Normal breath sounds.      Comments: Slightly diminished  Abdominal:      General: Bowel sounds are normal. There is no distension.      Palpations: Abdomen is soft.      Tenderness: There is no abdominal tenderness.   Musculoskeletal:         General: Normal range of motion.      Right lower leg: No edema.      Left lower leg: No edema.      Comments: Mild edema noted to RUE   Skin:     General: Skin is warm and dry.      Comments: Right upper chest wall hemodialysis catheters intact    Neurological:      General: No focal deficit present.      Mental Status: He is alert.      Comments: Slightly poor historian   Psychiatric:          Mood and Affect: Mood normal.         Behavior: Behavior normal.         Assessment/Plan  Problem List Items Addressed This Visit          Circulatory    Chronic heart failure with preserved ejection fraction (CMS/HCC)     Fluid status monitored by dialysis center.   No symptoms of central fluid volume overload.             HTN (hypertension)     Lisinopril was increased, hydralazine was discontinued.   Continue to monitor and adjust meds based on clinical course.                    Digestive    GI bleed     He states no further blood in bowel movements the last few weeks. Staff to monitor and notify for any recurrence of symptoms.           Esophageal candidiasis (CMS/[facility]) - Primary     Per egd, started on fluconazole and stop date 6/1 but per staff this was never delivered from pharmacy so not started as of yet.  Course will be started and completed for intended duration.  He states he is swallowing better.             Genitourinary    ESRD on hemodialysis (CMS/[facility])     Follow up with dialysis center.    Labs to be monitored through dialysis center            Other    Splenic mass     5cm mass in spleen, recommended MRI as an outpatient, [facility]        labs/meds/orders reviewed  staff to monitor and notify for any changes.  Follow  up with GI for repeat EGD in 6-8 weeks  Follow up [facility] for possible MRI for splenic mass and abnormal PET scan  Staff is contacting pharmacy to inquire about fluconazole.         Electronically Signed By: HEATH Rocha-MARY JANE   5/30/23  1:26 PM

## 2023-05-30 NOTE — ASSESSMENT & PLAN NOTE
Per egd, started on fluconazole and stop date 6/1 but per staff this was never delivered from pharmacy so not started as of yet.  Course will be started and completed for intended duration.  He states he is swallowing better.

## 2023-05-30 NOTE — ASSESSMENT & PLAN NOTE
He states no further blood in bowel movements the last few weeks. Staff to monitor and notify for any recurrence of symptoms.

## 2023-05-30 NOTE — PROGRESS NOTES
Subjective   Raymond Bruton is a 77 y.o. male here for weekly skilled visit.   HPI   Here for weekly skilled visit.  Health problems reviewed and no acute concerns.  Participating in therapy and feeling stronger.  Swallowing is improved, he has not started the fluconazole per staff as it has not been delivered by pharmacy.  Denies any complaints of pain.  No concerns per staff.  Following up with dialysis center.             Review of Systems   Constitutional:  Negative for chills, fatigue and fever.   Respiratory:  Negative for cough and shortness of breath.    Cardiovascular:  Negative for chest pain and palpitations.   Gastrointestinal:  Negative for abdominal pain, constipation, diarrhea, nausea and vomiting.       Objective   /76   Pulse 76   Temp 36.4 °C (97.6 °F)   Resp 18   Wt 64 kg (141 lb)   SpO2 98%     Physical Exam  Constitutional:       General: He is not in acute distress.  HENT:      Head: Normocephalic and atraumatic.      Mouth/Throat:      Comments: Poor dentition  Eyes:      Conjunctiva/sclera: Conjunctivae normal.   Cardiovascular:      Rate and Rhythm: Normal rate and regular rhythm.   Pulmonary:      Effort: Pulmonary effort is normal. No respiratory distress.      Breath sounds: Normal breath sounds.      Comments: Slightly diminished  Abdominal:      General: Bowel sounds are normal. There is no distension.      Palpations: Abdomen is soft.      Tenderness: There is no abdominal tenderness.   Musculoskeletal:         General: Normal range of motion.      Right lower leg: No edema.      Left lower leg: No edema.      Comments: Mild edema noted to RUE   Skin:     General: Skin is warm and dry.      Comments: Right upper chest wall hemodialysis catheters intact    Neurological:      General: No focal deficit present.      Mental Status: He is alert.      Comments: Slightly poor historian   Psychiatric:         Mood and Affect: Mood normal.         Behavior: Behavior normal.          Assessment/Plan   Problem List Items Addressed This Visit          Circulatory    Chronic heart failure with preserved ejection fraction (CMS/HCC)     Fluid status monitored by dialysis center.   No symptoms of central fluid volume overload.             HTN (hypertension)     Lisinopril was increased, hydralazine was discontinued.   Continue to monitor and adjust meds based on clinical course.                    Digestive    GI bleed     He states no further blood in bowel movements the last few weeks. Staff to monitor and notify for any recurrence of symptoms.           Esophageal candidiasis (CMS/HCC) - Primary     Per egd, started on fluconazole and stop date 6/1 but per staff this was never delivered from pharmacy so not started as of yet.  Course will be started and completed for intended duration.  He states he is swallowing better.             Genitourinary    ESRD on hemodialysis (CMS/HCC)     Follow up with dialysis center.    Labs to be monitored through dialysis center            Other    Splenic mass     5cm mass in spleen, recommended MRI as an outpatient, [facility]        labs/meds/orders reviewed  staff to monitor and notify for any changes.  Follow  up with GI for repeat EGD in 6-8 weeks  [facility] for possible [facility] for splenic mass and abnormal PET scan  Staff is contacting pharmacy to inquire about fluconazole.

## 2023-05-31 ASSESSMENT — ENCOUNTER SYMPTOMS
CHILLS: 0
VOMITING: 0
DIAPHORESIS: 0
COUGH: 0
DIARRHEA: 0
NAUSEA: 0
JOINT SWELLING: 0
MYALGIAS: 0
SHORTNESS OF BREATH: 0
CONSTIPATION: 0
FEVER: 0

## 2023-06-02 ENCOUNTER — NURSING HOME VISIT (OUTPATIENT)
Dept: POST ACUTE CARE | Facility: EXTERNAL LOCATION | Age: 78
End: 2023-06-02
Payer: COMMERCIAL

## 2023-06-02 DIAGNOSIS — I50.32 CHRONIC HEART FAILURE WITH PRESERVED EJECTION FRACTION (MULTI): ICD-10-CM

## 2023-06-02 DIAGNOSIS — Z99.2 ESRD ON HEMODIALYSIS (MULTI): Primary | ICD-10-CM

## 2023-06-02 DIAGNOSIS — B37.81 ESOPHAGEAL CANDIDIASIS (MULTI): ICD-10-CM

## 2023-06-02 DIAGNOSIS — F01.50 VASCULAR DEMENTIA WITHOUT BEHAVIORAL DISTURBANCE (MULTI): ICD-10-CM

## 2023-06-02 DIAGNOSIS — N18.6 ESRD ON HEMODIALYSIS (MULTI): Primary | ICD-10-CM

## 2023-06-02 PROCEDURE — 99308 SBSQ NF CARE LOW MDM 20: CPT | Performed by: INTERNAL MEDICINE

## 2023-06-02 NOTE — LETTER
Patient: Raymond Bruton  : 1945    Encounter Date: 2023    Subjective  Patient ID: Raymond Bruton is a 77 y.o. male who is long term resident being seen and evaluated for multiple medical problems.    HPI   77-year-old male patient up and about the facility with his walker in no distress.  He unfortunately had accidental BM at hemodialysis today necessitating early transfer back to the facility.  The patient himself has no complaints of pain or shortness of breath me at this time.  He asks that his stool regimen be made as needed instead of scheduled at this time.  Nursing has no new adverse events reported.    Current high risk medication:  Amlodipine  Apixaban  Senna    Laboratory examinations are monitored by the patient's hemodialysis unit and are not available for review on the chart.    Review of Systems   Constitutional:  Negative for chills, diaphoresis and fever.   Respiratory:  Negative for cough and shortness of breath.    Cardiovascular:  Negative for chest pain and leg swelling.   Gastrointestinal:  Negative for constipation, diarrhea, nausea and vomiting.        Uncontrollable diarrhea   Musculoskeletal:  Negative for joint swelling and myalgias.       Objective  /84   Pulse 81   Temp 36.7 °C (98 °F)   Resp 17   Wt 63.5 kg (140 lb)   SpO2 98%     Physical Exam  Constitutional:       General: He is not in acute distress.  HENT:      Head: Normocephalic and atraumatic.      Mouth/Throat:      Comments: Poor dentition  Eyes:      Conjunctiva/sclera: Conjunctivae normal.   Cardiovascular:      Rate and Rhythm: Normal rate and regular rhythm.   Pulmonary:      Effort: Pulmonary effort is normal. No respiratory distress.      Breath sounds: Normal breath sounds.      Comments: Slightly diminished  Abdominal:      General: Bowel sounds are normal. There is no distension.      Palpations: Abdomen is soft.      Tenderness: There is no abdominal tenderness.   Musculoskeletal:          General: Normal range of motion.      Right lower leg: No edema.      Left lower leg: No edema.      Comments: Mild edema noted to RUE   Skin:     General: Skin is warm and dry.      Comments: Right upper chest wall hemodialysis catheters intact    Neurological:      General: No focal deficit present.      Mental Status: He is alert.      Comments: Slightly poor historian   Psychiatric:         Mood and Affect: Mood normal.         Behavior: Behavior normal.         Assessment/Plan  Problem List Items Addressed This Visit          Nervous    Vascular dementia without behavioral disturbance (CMS/HCC)       Circulatory    Chronic heart failure with preserved ejection fraction (CMS/[facility])       Digestive    Esophageal candidiasis (CMS/[facility])       Genitourinary    ESRD on hemodialysis (CMS/[facility]) - Primary       A.  We will continue with restorative and supportive care as the patient tolerates    B.  The patient will have ongoing hemodialysis his dialysis unit as per schedule    C.  The patient's bowel regimen will be changed to as needed so as to avoid accidents on days of hemodialysis    D.  The patient's prognosis is guarded.    A.  He is completing treatment for esophageal candidiasis      Electronically Signed By: Jacob Posada MD   6/8/23  2:47 PM

## 2023-06-05 VITALS
DIASTOLIC BLOOD PRESSURE: 84 MMHG | TEMPERATURE: 98 F | OXYGEN SATURATION: 98 % | WEIGHT: 140 LBS | SYSTOLIC BLOOD PRESSURE: 159 MMHG | RESPIRATION RATE: 17 BRPM | HEART RATE: 81 BPM

## 2023-06-05 NOTE — PROGRESS NOTES
Subjective   Patient ID: Raymond Bruton is a 77 y.o. male who is long term resident being seen and evaluated for multiple medical problems.    HPI   77-year-old male patient up and about the facility with his walker in no distress.  He unfortunately had accidental BM at hemodialysis today necessitating early transfer back to the facility.  The patient himself has no complaints of pain or shortness of breath me at this time.  He asks that his stool regimen be made as needed instead of scheduled at this time.  Nursing has no new adverse events reported.    Current high risk medication:  Amlodipine  Apixaban  Senna    Laboratory examinations are monitored by the patient's hemodialysis unit and are not available for review on the chart.    Review of Systems   Constitutional:  Negative for chills, diaphoresis and fever.   Respiratory:  Negative for cough and shortness of breath.    Cardiovascular:  Negative for chest pain and leg swelling.   Gastrointestinal:  Negative for constipation, diarrhea, nausea and vomiting.        Uncontrollable diarrhea   Musculoskeletal:  Negative for joint swelling and myalgias.       Objective   /84   Pulse 81   Temp 36.7 °C (98 °F)   Resp 17   Wt 63.5 kg (140 lb)   SpO2 98%     Physical Exam  Constitutional:       General: He is not in acute distress.  HENT:      Head: Normocephalic and atraumatic.      Mouth/Throat:      Comments: Poor dentition  Eyes:      Conjunctiva/sclera: Conjunctivae normal.   Cardiovascular:      Rate and Rhythm: Normal rate and regular rhythm.   Pulmonary:      Effort: Pulmonary effort is normal. No respiratory distress.      Breath sounds: Normal breath sounds.      Comments: Slightly diminished  Abdominal:      General: Bowel sounds are normal. There is no distension.      Palpations: Abdomen is soft.      Tenderness: There is no abdominal tenderness.   Musculoskeletal:         General: Normal range of motion.      Right lower leg: No edema.      Left  lower leg: No edema.      Comments: Mild edema noted to RUE   Skin:     General: Skin is warm and dry.      Comments: Right upper chest wall hemodialysis catheters intact    Neurological:      General: No focal deficit present.      Mental Status: He is alert.      Comments: Slightly poor historian   Psychiatric:         Mood and Affect: Mood normal.         Behavior: Behavior normal.         Assessment/Plan   Problem List Items Addressed This Visit          Nervous    Vascular dementia without behavioral disturbance (CMS/HCC)       Circulatory    Chronic heart failure with preserved ejection fraction (CMS/HCC)       Digestive    Esophageal candidiasis (CMS/HCC)       Genitourinary    ESRD on hemodialysis (CMS/HCC) - Primary       A.  We will continue with restorative and supportive care as the patient tolerates    B.  The patient will have ongoing hemodialysis his dialysis unit as per schedule    C.  The patient's bowel regimen will be changed to as needed so as to avoid accidents on days of hemodialysis    D.  The patient's prognosis is guarded.    A.  He is completing treatment for esophageal candidiasis

## 2023-06-08 ASSESSMENT — ENCOUNTER SYMPTOMS
FEVER: 0
MYALGIAS: 0
CONSTIPATION: 0
DIARRHEA: 0
VOMITING: 0
CHILLS: 0
SHORTNESS OF BREATH: 0
NAUSEA: 0
COUGH: 0
DIAPHORESIS: 0
JOINT SWELLING: 0

## 2023-07-07 ENCOUNTER — NURSING HOME VISIT (OUTPATIENT)
Dept: POST ACUTE CARE | Facility: EXTERNAL LOCATION | Age: 78
End: 2023-07-07
Payer: OTHER GOVERNMENT

## 2023-07-07 VITALS
RESPIRATION RATE: 18 BRPM | HEART RATE: 78 BPM | WEIGHT: 138 LBS | DIASTOLIC BLOOD PRESSURE: 83 MMHG | OXYGEN SATURATION: 96 % | SYSTOLIC BLOOD PRESSURE: 158 MMHG | TEMPERATURE: 97.6 F

## 2023-07-07 DIAGNOSIS — Z87.39 HISTORY OF GOUT: ICD-10-CM

## 2023-07-07 DIAGNOSIS — Z99.2 ESRD ON HEMODIALYSIS (MULTI): ICD-10-CM

## 2023-07-07 DIAGNOSIS — I50.32 CHRONIC HEART FAILURE WITH PRESERVED EJECTION FRACTION (MULTI): ICD-10-CM

## 2023-07-07 DIAGNOSIS — F01.50 VASCULAR DEMENTIA WITHOUT BEHAVIORAL DISTURBANCE (MULTI): ICD-10-CM

## 2023-07-07 DIAGNOSIS — N18.6 ESRD ON HEMODIALYSIS (MULTI): ICD-10-CM

## 2023-07-07 DIAGNOSIS — I25.10 CORONARY ARTERY DISEASE INVOLVING NATIVE CORONARY ARTERY OF NATIVE HEART, UNSPECIFIED WHETHER ANGINA PRESENT: ICD-10-CM

## 2023-07-07 DIAGNOSIS — M19.079 ANKLE ARTHRITIS: Primary | ICD-10-CM

## 2023-07-07 DIAGNOSIS — I10 PRIMARY HYPERTENSION: ICD-10-CM

## 2023-07-07 PROCEDURE — 99309 SBSQ NF CARE MODERATE MDM 30: CPT | Performed by: INTERNAL MEDICINE

## 2023-07-07 NOTE — PROGRESS NOTES
Subjective   Patient ID: Raymond Bruton is a 77 y.o. male who is long term resident being seen and evaluated for multiple medical problems.    HPI   The patient is resting comfortably in his wheelchair out in the common area waiting transport for hemodialysis.  The patient reports that he is having gout pains in his right ankle.  He reports it has been warm and swollen and that he had it for a while.  He reports positive history of gout in the past.  He is currently not being prescribed uric acid lowering therapy.  He otherwise has no new complaints.  There is been no reported hypotension by nursing.    Current high risk medication:  Amlodipine  Senna    There are no laboratory examinations on the chart as the patient's hemodialysis unit monitors his labs.    Review of Systems   Constitutional:  Negative for chills, diaphoresis and fever.   Respiratory:  Negative for cough and shortness of breath.    Cardiovascular:  Negative for chest pain and leg swelling.   Gastrointestinal:  Negative for constipation, diarrhea, nausea and vomiting.        Uncontrollable diarrhea   Musculoskeletal:  Negative for joint swelling and myalgias.        HPI       Objective   /83   Pulse 78   Temp 36.4 °C (97.6 °F)   Resp 18   Wt 62.6 kg (138 lb)   SpO2 96%     Physical Exam  Constitutional:       General: He is not in acute distress.  HENT:      Head: Normocephalic and atraumatic.      Mouth/Throat:      Comments: Poor dentition  Eyes:      Conjunctiva/sclera: Conjunctivae normal.   Cardiovascular:      Rate and Rhythm: Normal rate and regular rhythm.   Pulmonary:      Effort: Pulmonary effort is normal. No respiratory distress.      Breath sounds: Normal breath sounds.      Comments: Slightly diminished  Abdominal:      General: Bowel sounds are normal. There is no distension.      Palpations: Abdomen is soft.      Tenderness: There is no abdominal tenderness.   Musculoskeletal:         General: Swelling present. Normal  range of motion.      Right lower leg: No edema.      Left lower leg: No edema.      Comments: The patient has mild induration and warmth of the right ankle but no erythema or evidence for cellulitis.   Skin:     General: Skin is warm and dry.      Comments: Right upper chest wall hemodialysis catheters intact    Neurological:      General: No focal deficit present.      Mental Status: He is alert.      Comments: Slightly poor historian   Psychiatric:         Mood and Affect: Mood normal.         Behavior: Behavior normal.         Assessment/Plan   Problem List Items Addressed This Visit       CAD (coronary artery disease)    Chronic heart failure with preserved ejection fraction (CMS/HCC)    ESRD on hemodialysis (CMS/[facility])    HTN (hypertension)    Vascular dementia without behavioral disturbance (CMS/[facility])    Ankle arthritis - Primary    History of gout       A.  We will continue with restorative and supportive care along with hemodialysis the patient tolerates    B.  Laboratory examinations are being ordered to check for the presence of uric acid and gout.  If his uric acid level is elevated then will likely request for nephrology to be able to start uric acid lowering therapy with allopurinol or febuxostat    C.  Ongoing long-term laboratory examinations will be monitored by his hemodialysis unit    D.  The patient reports no recurrence of esophageal disorder to suggest recurrence of his esophageal candidiasis    8.  The patient's prognosis is guarded.

## 2023-07-07 NOTE — LETTER
Patient: Raymond Bruton  : 1945    Encounter Date: 2023    Subjective  Patient ID: Raymond Bruton is a 77 y.o. male who is long term resident being seen and evaluated for multiple medical problems.    HPI   The patient is resting comfortably in his wheelchair out in the common area waiting transport for hemodialysis.  The patient reports that he is having gout pains in his right ankle.  He reports it has been warm and swollen and that he had it for a while.  He reports positive history of gout in the past.  He is currently not being prescribed uric acid lowering therapy.  He otherwise has no new complaints.  There is been no reported hypotension by nursing.    Current high risk medication:  Amlodipine  Senna    There are no laboratory examinations on the chart as the patient's hemodialysis unit monitors his labs.    Review of Systems   Constitutional:  Negative for chills, diaphoresis and fever.   Respiratory:  Negative for cough and shortness of breath.    Cardiovascular:  Negative for chest pain and leg swelling.   Gastrointestinal:  Negative for constipation, diarrhea, nausea and vomiting.        Uncontrollable diarrhea   Musculoskeletal:  Negative for joint swelling and myalgias.        HPI       Objective  /83   Pulse 78   Temp 36.4 °C (97.6 °F)   Resp 18   Wt 62.6 kg (138 lb)   SpO2 96%     Physical Exam  Constitutional:       General: He is not in acute distress.  HENT:      Head: Normocephalic and atraumatic.      Mouth/Throat:      Comments: Poor dentition  Eyes:      Conjunctiva/sclera: Conjunctivae normal.   Cardiovascular:      Rate and Rhythm: Normal rate and regular rhythm.   Pulmonary:      Effort: Pulmonary effort is normal. No respiratory distress.      Breath sounds: Normal breath sounds.      Comments: Slightly diminished  Abdominal:      General: Bowel sounds are normal. There is no distension.      Palpations: Abdomen is soft.      Tenderness: There is no abdominal  tenderness.   Musculoskeletal:         General: Swelling present. Normal range of motion.      Right lower leg: No edema.      Left lower leg: No edema.      Comments: The patient has mild induration and warmth of the right ankle but no erythema or evidence for cellulitis.   Skin:     General: Skin is warm and dry.      Comments: Right upper chest wall hemodialysis catheters intact    Neurological:      General: No focal deficit present.      Mental Status: He is alert.      Comments: Slightly poor historian   Psychiatric:         Mood and Affect: Mood normal.         Behavior: Behavior normal.         Assessment/Plan  Problem List Items Addressed This Visit       CAD (coronary artery disease)    Chronic heart failure with preserved ejection fraction (CMS/HCC)    ESRD on hemodialysis (CMS/[facility])    HTN (hypertension)    Vascular dementia without behavioral disturbance (CMS/[facility])    Ankle arthritis - Primary    History of gout       A.  We will continue with restorative and supportive care along with hemodialysis the patient tolerates    B.  Laboratory examinations are being ordered to check for the presence of uric acid and gout.  If his uric acid level is elevated then will likely request for nephrology to be able to start uric acid lowering therapy with allopurinol or febuxostat    C.  Ongoing long-term laboratory examinations will be monitored by his hemodialysis unit    D.  The patient reports no recurrence of esophageal disorder to suggest recurrence of his esophageal candidiasis    8.  The patient's prognosis is guarded.      Electronically Signed By: Jacob Posada MD   7/18/23 11:12 AM

## 2023-07-18 PROBLEM — M19.079 ANKLE ARTHRITIS: Status: ACTIVE | Noted: 2023-07-18

## 2023-07-18 PROBLEM — Z87.39 HISTORY OF GOUT: Status: ACTIVE | Noted: 2023-07-18

## 2023-07-18 ASSESSMENT — ENCOUNTER SYMPTOMS
VOMITING: 0
JOINT SWELLING: 0
DIARRHEA: 0
NAUSEA: 0
SHORTNESS OF BREATH: 0
FEVER: 0
DIAPHORESIS: 0
COUGH: 0
MYALGIAS: 0
CONSTIPATION: 0
CHILLS: 0

## 2023-07-25 ENCOUNTER — NURSING HOME VISIT (OUTPATIENT)
Dept: POST ACUTE CARE | Facility: EXTERNAL LOCATION | Age: 78
End: 2023-07-25
Payer: OTHER GOVERNMENT

## 2023-07-25 DIAGNOSIS — N18.6 ESRD ON HEMODIALYSIS (MULTI): Primary | ICD-10-CM

## 2023-07-25 DIAGNOSIS — I50.32 CHRONIC HEART FAILURE WITH PRESERVED EJECTION FRACTION (MULTI): ICD-10-CM

## 2023-07-25 DIAGNOSIS — F01.50 VASCULAR DEMENTIA WITHOUT BEHAVIORAL DISTURBANCE (MULTI): ICD-10-CM

## 2023-07-25 DIAGNOSIS — Z99.2 ESRD ON HEMODIALYSIS (MULTI): Primary | ICD-10-CM

## 2023-07-25 DIAGNOSIS — M10.9 GOUT, UNSPECIFIED CAUSE, UNSPECIFIED CHRONICITY, UNSPECIFIED SITE: ICD-10-CM

## 2023-07-25 DIAGNOSIS — B37.81 ESOPHAGEAL CANDIDIASIS (MULTI): ICD-10-CM

## 2023-07-25 PROCEDURE — 99309 SBSQ NF CARE MODERATE MDM 30: CPT | Performed by: NURSE PRACTITIONER

## 2023-07-25 ASSESSMENT — ENCOUNTER SYMPTOMS
SHORTNESS OF BREATH: 0
DIARRHEA: 0
FEVER: 0
VOMITING: 0
FATIGUE: 0
CONSTIPATION: 0
NAUSEA: 0
COUGH: 0
ABDOMINAL PAIN: 0
CHILLS: 0
PALPITATIONS: 0

## 2023-07-25 NOTE — PROGRESS NOTES
Subjective   Raymond Bruton is a 77 y.o. male Here for routine monthly visit.    HPI There are no new problems and multiple health problems have been reviewed.  The course has been unchanged.  The patient  is moderately confused.   There are no family members present during time of visit.    he  denies any complaints when asked.  Eating and drinking well.   No concerns per staff.   He was evalauted in the er last night due to elevated BP, difficulty ambulating and increased confusion upon returning from dialysis.   He returned to the facility last evening.   There have been no vital documented since return from ER.       Review of Systems   Constitutional:  Negative for chills, fatigue and fever.   Respiratory:  Negative for cough and shortness of breath.    Cardiovascular:  Negative for chest pain and palpitations.   Gastrointestinal:  Negative for abdominal pain, constipation, diarrhea, nausea and vomiting.       Objective   There were no vitals taken for this visit.    Physical Exam  Constitutional:       General: He is not in acute distress.  HENT:      Head: Normocephalic and atraumatic.      Mouth/Throat:      Comments: Poor dentition  Eyes:      Conjunctiva/sclera: Conjunctivae normal.   Cardiovascular:      Rate and Rhythm: Normal rate and regular rhythm.   Pulmonary:      Effort: Pulmonary effort is normal. No respiratory distress.      Breath sounds: Normal breath sounds.      Comments: Slightly diminished  Abdominal:      General: Bowel sounds are normal. There is no distension.      Palpations: Abdomen is soft.      Tenderness: There is no abdominal tenderness.   Musculoskeletal:         General: Normal range of motion.      Right lower leg: No edema.      Left lower leg: No edema.      Comments: Mild edema noted to RUE   Skin:     General: Skin is warm and dry.      Comments: Right upper chest wall hemodialysis catheters intact    Neurological:      General: No focal deficit present.      Mental Status:  He is alert.      Comments: Slightly poor historian   Psychiatric:         Mood and Affect: Mood normal.         Behavior: Behavior normal.         Assessment/Plan   Problem List Items Addressed This Visit       Chronic heart failure with preserved ejection fraction (CMS/[facility])     Fluid status monitored by dialysis center.   No symptoms of central fluid volume overload.             ESRD on hemodialysis (CMS/[facility]) - Primary     Follow up with dialysis center.    Labs to be monitored through dialysis center         Gout     Uric acid normal at 3.1.           Vascular dementia without behavioral disturbance (CMS/[facility])     Oriented times 2,  he was deemed unable to make medical or financial decisions.  Daughter Aimee is POA.            Esophageal candidiasis (CMS/[facility])     He denies any recurrence of swallowing issues        labs/meds/orders reviewed  staff to monitor and notify for any changes.  Er records reivewed  Labs to be monitored through dialysis center  Uric acid nomal.  Mild elevation in sed rate and CRP.

## 2023-07-25 NOTE — LETTER
Patient: Raymond Bruton  : 1945    Encounter Date: 2023    Subjective  Raymond Bruton is a 77 y.o. male Here for routine monthly visit.    HPI There are no new problems and multiple health problems have been reviewed.  The course has been unchanged.  The patient  is moderately confused.   There are no family members present during time of visit.    he  denies any complaints when asked.  Eating and drinking well.   No concerns per staff.   He was evalauted in the er last night due to elevated BP, difficulty ambulating and increased confusion upon returning from dialysis.   He returned to the facility last evening.   There have been no vital documented since return from ER.       Review of Systems   Constitutional:  Negative for chills, fatigue and fever.   Respiratory:  Negative for cough and shortness of breath.    Cardiovascular:  Negative for chest pain and palpitations.   Gastrointestinal:  Negative for abdominal pain, constipation, diarrhea, nausea and vomiting.       Objective  There were no vitals taken for this visit.    Physical Exam  Constitutional:       General: He is not in acute distress.  HENT:      Head: Normocephalic and atraumatic.      Mouth/Throat:      Comments: Poor dentition  Eyes:      Conjunctiva/sclera: Conjunctivae normal.   Cardiovascular:      Rate and Rhythm: Normal rate and regular rhythm.   Pulmonary:      Effort: Pulmonary effort is normal. No respiratory distress.      Breath sounds: Normal breath sounds.      Comments: Slightly diminished  Abdominal:      General: Bowel sounds are normal. There is no distension.      Palpations: Abdomen is soft.      Tenderness: There is no abdominal tenderness.   Musculoskeletal:         General: Normal range of motion.      Right lower leg: No edema.      Left lower leg: No edema.      Comments: Mild edema noted to RUE   Skin:     General: Skin is warm and dry.      Comments: Right upper chest wall hemodialysis catheters intact     Neurological:      General: No focal deficit present.      Mental Status: He is alert.      Comments: Slightly poor historian   Psychiatric:         Mood and Affect: Mood normal.         Behavior: Behavior normal.         Assessment/Plan  Problem List Items Addressed This Visit       Chronic heart failure with preserved ejection fraction (CMS/[facility])     Fluid status monitored by dialysis center.   No symptoms of central fluid volume overload.             ESRD on hemodialysis (CMS/[facility]) - Primary     Follow up with dialysis center.    Labs to be monitored through dialysis center         Gout     Uric acid normal at 3.1.           Vascular dementia without behavioral disturbance (CMS/[facility])     Oriented times 2,  he was deemed unable to make medical or financial decisions.  Daughter Aimee is POA.            Esophageal candidiasis (CMS/[facility])     He denies any recurrence of swallowing issues        labs/meds/orders reviewed  staff to monitor and notify for any changes.  Er records reivewed  Labs to be monitored through dialysis center  Uric acid nomal.  Mild elevation in sed rate and CRP.       Electronically Signed By: KLAUDIA Rocha   7/25/23  4:23 PM